# Patient Record
Sex: MALE | Race: WHITE | Employment: FULL TIME | URBAN - METROPOLITAN AREA
[De-identification: names, ages, dates, MRNs, and addresses within clinical notes are randomized per-mention and may not be internally consistent; named-entity substitution may affect disease eponyms.]

---

## 2024-08-07 NOTE — PROGRESS NOTES
[facility]  DIVISION OF OTOLARYNGOLOGY- HEAD & NECK SURGERY  CONSULT    Patient Name: Amador Bradley  Medical Record Number:  [id number]  Primary Care Physician:  No primary care provider on file.  Date of Consultation: 8/8/2024    Chief Complaint:   Chief Complaint   Patient presents with    New Patient     Patient is being seen for an ear cleaning.       HISTORY OF PRESENT ILLNESS  Amador is a(n) 59 y.o. male who presents for evaluation of hearing loss.  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  He does have a wax impaction issues as well and typically gets his ears cleaned somewhat frequently.  He states that he is unable to hear at all out of the right today and the left is starting to get that way as well.  There is no problem list on file for this patient.    No past surgical history on file.  No family history on file.  Social History     Socioeconomic History    Marital status: Single     Spouse name: Not on file    Number of children: Not on file    Years of education: Not on file    Highest education level: Not on file   Occupational History    Not on file   Tobacco Use    Smoking status: Never    Smokeless tobacco: Never   Substance and Sexual Activity    Alcohol use: Not on file    Drug use: Not on file    Sexual activity: Not on file   Other Topics Concern    Not on file   Social History Narrative    Not on file     Social Determinants of Health     Financial Resource Strain: Not on file   Food Insecurity: Not on file   Transportation Needs: Not on file   Physical Activity: Not on file   Stress: Not on file   Social Connections: Not on file   Intimate Partner Violence: Not on file   Housing Stability: Not on file     DRUG/FOOD ALLERGIES: Patient has no known allergies.  CURRENT MEDICATIONS  Prior to Admission medications    Not on File     REVIEW OF SYSTEMS  The following systems were reviewed and revealed the following in

## 2024-08-08 ENCOUNTER — OFFICE VISIT (OUTPATIENT)
Dept: ENT CLINIC | Age: 59
End: 2024-08-08

## 2024-08-08 VITALS
DIASTOLIC BLOOD PRESSURE: 88 MMHG | BODY MASS INDEX: 25.03 KG/M2 | WEIGHT: 195 LBS | HEART RATE: 70 BPM | RESPIRATION RATE: 16 BRPM | HEIGHT: 74 IN | SYSTOLIC BLOOD PRESSURE: 134 MMHG

## 2024-08-08 DIAGNOSIS — H91.93 BILATERAL HEARING LOSS, UNSPECIFIED HEARING LOSS TYPE: ICD-10-CM

## 2024-08-08 DIAGNOSIS — H61.23 BILATERAL IMPACTED CERUMEN: Primary | ICD-10-CM

## 2024-08-08 PROCEDURE — 69210 REMOVE IMPACTED EAR WAX UNI: CPT | Performed by: STUDENT IN AN ORGANIZED HEALTH CARE EDUCATION/TRAINING PROGRAM

## 2024-08-08 PROCEDURE — 99203 OFFICE O/P NEW LOW 30 MIN: CPT | Performed by: STUDENT IN AN ORGANIZED HEALTH CARE EDUCATION/TRAINING PROGRAM

## 2024-08-08 ASSESSMENT — ENCOUNTER SYMPTOMS
EYE PAIN: 0
RHINORRHEA: 0
COUGH: 0
SHORTNESS OF BREATH: 0
VOMITING: 0
NAUSEA: 0

## 2025-03-11 ENCOUNTER — INITIAL CONSULT (OUTPATIENT)
Dept: SURGERY | Age: 60
End: 2025-03-11

## 2025-03-11 VITALS — SYSTOLIC BLOOD PRESSURE: 136 MMHG | DIASTOLIC BLOOD PRESSURE: 72 MMHG

## 2025-03-11 DIAGNOSIS — K40.90 RIGHT INGUINAL HERNIA: Primary | ICD-10-CM

## 2025-03-11 NOTE — PROGRESS NOTES
Department of General Surgery Consult    PATIENT NAME: Amador Bradley   YOB: 1965     TODAY'S DATE: 3/11/2025    Reason for Consult:  Right inguinal Hernia    Chief Complaint: Right inguinal Hernia    Historian: Patient    Requesting Practitioner:  Virginia Stearns    HISTORY OF PRESENT ILLNESS:              The patient is a 59 y.o. male who presents with Right inguinal hernia. Was lifting something heavy about 7 years ago, didn't feel anything but was in the shower later and saw a bulge. Saw his urologist and confirmed it was a hernia, has been doing some watchful waiting but feels it has started to get larger in past few months and would like it to be fixed. Felt like at one point it had healed up but then picked up something heavy and reproduced the hernia. Has not had a hernia before. 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      Denies any Abdominal surgeries.     Only takes Vitamin D Supplement.     Denies: N/V/D/C     Past Medical History:    No past medical history on file.    Past Surgical History:    No past surgical history on file.    Current Medications:   No current facility-administered medications for this visit.  Prior to Admission medications    Not on File        Allergies:  Patient has no known allergies.    Social History:   TOBACCO:  Never used tobacco  ETOH:  2-3 Beer's a night,   DRUGS:  Never used recreational drugs  OCCUPATION:  /  Patient currently lives alone    Family History:    No family history on file.    REVIEW OF SYSTEMS:  CONSTITUTIONAL:  negative  HEENT:  negative  RESPIRATORY:  negative  CARDIOVASCULAR:  negative  GASTROINTESTINAL:  negative except for abdominal mass  GENITOURINARY:  negative  HEMATOLOGIC/LYMPHATIC:  negative  NEUROLOGICAL:  Negative  * All other ROS reviewed and negative.       PHYSICAL EXAM:  VITALS:  /72 (BP Site: Left Upper Arm, Patient Position: Sitting, BP Cuff Size: Medium Adult)

## 2025-03-12 ENCOUNTER — PREP FOR PROCEDURE (OUTPATIENT)
Dept: SURGERY | Age: 60
End: 2025-03-12

## 2025-03-12 ENCOUNTER — TELEPHONE (OUTPATIENT)
Dept: SURGERY | Age: 60
End: 2025-03-12

## 2025-03-12 DIAGNOSIS — K40.90 RIGHT INGUINAL HERNIA: ICD-10-CM

## 2025-03-12 RX ORDER — SODIUM CHLORIDE 0.9 % (FLUSH) 0.9 %
5-40 SYRINGE (ML) INJECTION EVERY 12 HOURS SCHEDULED
OUTPATIENT
Start: 2025-03-12

## 2025-03-12 RX ORDER — SODIUM CHLORIDE 0.9 % (FLUSH) 0.9 %
5-40 SYRINGE (ML) INJECTION PRN
OUTPATIENT
Start: 2025-03-12

## 2025-03-12 RX ORDER — SODIUM CHLORIDE 9 MG/ML
INJECTION, SOLUTION INTRAVENOUS PRN
OUTPATIENT
Start: 2025-03-12

## 2025-03-12 NOTE — PROGRESS NOTES
Amador Stone    Age 59 y.o.    male    1965    MRN [id number]    4/3/2025  Arrival Time_____________  OR Time____________130 Min     Procedure(s):  ROBOTIC RIGHT INGUINAL HERNIA REPAIR, POSSIBLE OPEN PROCEDURE                      General   Surgeon(s):  Rasta Solano, MD      DAY ADMIT ___  SDS/OP ___  OUTPT IN BED ___        Phone [phone] (home) [phone] (work)                 PCP _____________________ Phone_________________ Epic ( ) Epic CE ( ) Appt ________    NOTES: _________________________________________ Consult/Cardio _______________    ____________________________________________________________________________    ____________________________________________________________________________  PAT APPT DATE:________ TIME: ________  FAXED QAD: _______  (__) H&P w/ Hospitalist    (__) PAT orders in EPIC    (__) Meet with PAT nurse  __________________________________________________________________________  Preop Nurse phone screen complete: _____________  (__) CBC     (__) W/ DIFF ___________  (__) CT CHEST  __________   (__) Hgb A1C    ___________  (__) CHEST X RAY   __________  (__) LIPID PROFILE  ___________  (__) EKG   __________  (__) PT-INR / APTT  ___________  (__) PFT's   __________  (__) BMP   ___________  (__) CAROTIDS  __________  (__) CMP   ___________  (__) VEIN MAPPING  __________  (__) U/A   ___________  ( X ) HISTORY & PHYSICAL __________  (__) URINE C & S  ___________  (__) CARDIAC CLEARANCE __________  (__) U/A W/ FLEX  ___________  (__) PULM. CLEARANCE __________  (__) SERUM PREGNANCY ___________  (__) Preop Orders in EPIC __________  (__) TYPE & SCREEN __________repeat ( ) (__)  __________________ __________  (__) Albumin   ___________  (__)  __________________ __________  (__) TRANSFERRIN  ___________  (__)  __________________ __________  (__) LIVER PROFILE  ___________  (__) URINE PREG DOS __________  (__) MRSA NASAL SWAB ___________  (__) BLOOD SUGAR DOS __________  (__)

## 2025-03-12 NOTE — TELEPHONE ENCOUNTER
Pt saw Dr Solano in the office 3/11/25 and was given surgery instructions for a Robotic Right Inguinal Hernia Repair, Possible Open Procedure (General Anes) scheduled 4/3/25 @ 9:15am arrival 7:15am [facility] - NPO after midnight ja b/f surgery - Pt will need  day of surgery - Dr Stearns to complete pre-op H&P - Pt understood and agreed w/ above noted  
English

## 2025-03-13 NOTE — ADDENDUM NOTE
Addended by: CELINE MONROY on: 3/12/2025 08:25 PM     Modules accepted: Orders, Level of Service

## 2025-03-13 NOTE — PATIENT INSTRUCTIONS
[facility]  Phone: [phone]  Fax: [phone]    You will be scheduled for surgery with Dr. Solano.   The office will call you with the date and time that surgery is scheduled.  Please take note of these instructions for surgery:  You should have nothing by mouth after midnight the night before your surgery - this includes no food or water.   Your surgery will be cancelled if you have taken anything by mouth after midnight, NO exceptions.   You will need to have a history and physical prior to your surgery. This will need to be completed up to 30 days before your surgery. This H/P can be completed by your family doctor or the hospital.   IF you take coumadin (warfarin), please stop taking this medication 5 days prior to your surgery.   IF you take plavix, please stop taking this 7 days prior to your surgery.   Please contact our office if you have a pacemaker or defibrillator.  IF you are allergic to latex, please tell our office prior to your surgery. This is important in know before scheduling your surgery.  IF you are having an out patient surgery, you will need someone available to drive you home after your surgery, and to also stay with you for the rest of the day.   IF you are having a surgery requiring an inpatient stay in the hospital, you will need someone to drive you home upon discharge from the hospital.  Please contact Dr. Solano's assistant Bety if you have any questions or concerns.  Please call the office with any changes in your symptoms or further questions/concerns. [phone]

## 2025-03-25 RX ORDER — SILDENAFIL 100 MG/1
100 TABLET, FILM COATED ORAL DAILY
COMMUNITY
End: 2025-03-25

## 2025-03-25 NOTE — PROGRESS NOTES
Surgery Date and Time:  4/3/25 @ 09:15 am      Arrival Time:  07:15 am       The instructions given when a patient needs to stop oral intake prior to surgery varies. Follow the instructions you were     given by your Surgeon or RN during the Pre-op call.      __X__Do not eat or drink anything after Midnight the night before the surgery. NO gum, mints, candy or ice chips the day of surgery.       Only take the following medications with a small sip of water the morning of surgery:  none        Aspirin, Ibuprofen, Advil, Naproxen, Vitamin E and other Anti-inflammatory products and supplements should be stopped       for 5-7days before surgery or as directed by your physician/surgeon.     - Do not smoke or vape, and do not drink any alcoholic beverages 24 hours prior to surgery, this includes NA Beer. Refrain from using     any recreational drugs, including non-prescribed prescription drugs.     -You may brush your teeth and gargle the morning of surgery.  DO NOT SWALLOW WATER.    -You MUST plan for a responsible adult to stay on site while you are here and take you home after your surgery. You will not be allowed                to leave alone or drive yourself home. It is requested someone stay with you the first 24 hrs. Your surgery will be cancelled if you do not                have a ride home with a responsible adult.    -A parent/legal guardian must accompany a child scheduled for surgery and plan to stay at the hospital until the child                is discharged. Please do not bring other children with you.    -Please wear simple, loose-fitting clothing to the hospital. Do not bring valuables (money, credit cards, checkbooks, etc.)                Do not wear any makeup (including no eye makeup) and no nail polish if applicable.             - DO NOT wear any jewelry or body piercings day of surgery.  All body piercing jewelry must be removed.             - If you have dentures they will be removed before

## 2025-04-02 ENCOUNTER — ANESTHESIA EVENT (OUTPATIENT)
Dept: OPERATING ROOM | Age: 60
End: 2025-04-02
Payer: COMMERCIAL

## 2025-04-03 ENCOUNTER — HOSPITAL ENCOUNTER (OUTPATIENT)
Age: 60
Setting detail: OUTPATIENT SURGERY
Discharge: HOME | End: 2025-04-03
Attending: SURGERY | Admitting: SURGERY
Payer: COMMERCIAL

## 2025-04-03 ENCOUNTER — ANESTHESIA (OUTPATIENT)
Dept: OPERATING ROOM | Age: 60
End: 2025-04-03
Payer: COMMERCIAL

## 2025-04-03 VITALS
HEIGHT: 74 IN | DIASTOLIC BLOOD PRESSURE: 87 MMHG | BODY MASS INDEX: 25.41 KG/M2 | TEMPERATURE: 97.1 F | OXYGEN SATURATION: 95 % | WEIGHT: 198 LBS | HEART RATE: 67 BPM | SYSTOLIC BLOOD PRESSURE: 135 MMHG | RESPIRATION RATE: 26 BRPM

## 2025-04-03 DIAGNOSIS — G89.18 POSTOPERATIVE PAIN: Primary | ICD-10-CM

## 2025-04-03 PROCEDURE — 6360000002 HC RX W HCPCS: Performed by: ANESTHESIOLOGY

## 2025-04-03 PROCEDURE — 3700000001 HC ADD 15 MINUTES (ANESTHESIA): Performed by: SURGERY

## 2025-04-03 PROCEDURE — 2500000003 HC RX 250 WO HCPCS: Performed by: SURGERY

## 2025-04-03 PROCEDURE — 2580000003 HC RX 258

## 2025-04-03 PROCEDURE — 3600000009 HC SURGERY ROBOT BASE: Performed by: SURGERY

## 2025-04-03 PROCEDURE — C1781 MESH (IMPLANTABLE): HCPCS | Performed by: SURGERY

## 2025-04-03 PROCEDURE — 6360000002 HC RX W HCPCS: Performed by: SURGERY

## 2025-04-03 PROCEDURE — 7100000010 HC PHASE II RECOVERY - FIRST 15 MIN: Performed by: SURGERY

## 2025-04-03 PROCEDURE — 6360000002 HC RX W HCPCS

## 2025-04-03 PROCEDURE — 2709999900 HC NON-CHARGEABLE SUPPLY: Performed by: SURGERY

## 2025-04-03 PROCEDURE — 49650 LAP ING HERNIA REPAIR INIT: CPT | Performed by: SURGERY

## 2025-04-03 PROCEDURE — 3600000019 HC SURGERY ROBOT ADDTL 15MIN: Performed by: SURGERY

## 2025-04-03 PROCEDURE — S2900 ROBOTIC SURGICAL SYSTEM: HCPCS | Performed by: SURGERY

## 2025-04-03 PROCEDURE — 7100000000 HC PACU RECOVERY - FIRST 15 MIN: Performed by: SURGERY

## 2025-04-03 PROCEDURE — 3700000000 HC ANESTHESIA ATTENDED CARE: Performed by: SURGERY

## 2025-04-03 PROCEDURE — 2500000003 HC RX 250 WO HCPCS

## 2025-04-03 PROCEDURE — 7100000011 HC PHASE II RECOVERY - ADDTL 15 MIN: Performed by: SURGERY

## 2025-04-03 PROCEDURE — 7100000001 HC PACU RECOVERY - ADDTL 15 MIN: Performed by: SURGERY

## 2025-04-03 DEVICE — 3DMAX MID ANATOMICAL MESH, 12 CM X 17 CM (5" X 7"), EXTRA LARGE, RIGHT
Type: IMPLANTABLE DEVICE | Status: FUNCTIONAL
Brand: 3DMAX

## 2025-04-03 RX ORDER — SODIUM CHLORIDE 9 MG/ML
INJECTION, SOLUTION INTRAVENOUS PRN
Status: DISCONTINUED | OUTPATIENT
Start: 2025-04-03 | End: 2025-04-03 | Stop reason: HOSPADM

## 2025-04-03 RX ORDER — MIDAZOLAM HYDROCHLORIDE 1 MG/ML
2 INJECTION, SOLUTION INTRAMUSCULAR; INTRAVENOUS
Status: DISCONTINUED | OUTPATIENT
Start: 2025-04-03 | End: 2025-04-03 | Stop reason: HOSPADM

## 2025-04-03 RX ORDER — SODIUM CHLORIDE 0.9 % (FLUSH) 0.9 %
5-40 SYRINGE (ML) INJECTION PRN
Status: DISCONTINUED | OUTPATIENT
Start: 2025-04-03 | End: 2025-04-03 | Stop reason: HOSPADM

## 2025-04-03 RX ORDER — MIDAZOLAM HYDROCHLORIDE 1 MG/ML
INJECTION, SOLUTION INTRAMUSCULAR; INTRAVENOUS
Status: DISCONTINUED | OUTPATIENT
Start: 2025-04-03 | End: 2025-04-03 | Stop reason: SDUPTHER

## 2025-04-03 RX ORDER — FENTANYL CITRATE 50 UG/ML
INJECTION, SOLUTION INTRAMUSCULAR; INTRAVENOUS
Status: DISCONTINUED | OUTPATIENT
Start: 2025-04-03 | End: 2025-04-03 | Stop reason: SDUPTHER

## 2025-04-03 RX ORDER — ONDANSETRON 2 MG/ML
INJECTION INTRAMUSCULAR; INTRAVENOUS
Status: DISCONTINUED | OUTPATIENT
Start: 2025-04-03 | End: 2025-04-03 | Stop reason: SDUPTHER

## 2025-04-03 RX ORDER — SODIUM CHLORIDE, SODIUM LACTATE, POTASSIUM CHLORIDE, CALCIUM CHLORIDE 600; 310; 30; 20 MG/100ML; MG/100ML; MG/100ML; MG/100ML
INJECTION, SOLUTION INTRAVENOUS CONTINUOUS
Status: DISCONTINUED | OUTPATIENT
Start: 2025-04-03 | End: 2025-04-03 | Stop reason: HOSPADM

## 2025-04-03 RX ORDER — OXYCODONE HYDROCHLORIDE 5 MG/1
5 TABLET ORAL PRN
Status: DISCONTINUED | OUTPATIENT
Start: 2025-04-03 | End: 2025-04-03 | Stop reason: HOSPADM

## 2025-04-03 RX ORDER — SODIUM CHLORIDE 0.9 % (FLUSH) 0.9 %
5-40 SYRINGE (ML) INJECTION EVERY 12 HOURS SCHEDULED
Status: DISCONTINUED | OUTPATIENT
Start: 2025-04-03 | End: 2025-04-03 | Stop reason: HOSPADM

## 2025-04-03 RX ORDER — SODIUM CHLORIDE 9 MG/ML
INJECTION, SOLUTION INTRAVENOUS
Status: DISCONTINUED | OUTPATIENT
Start: 2025-04-03 | End: 2025-04-03 | Stop reason: SDUPTHER

## 2025-04-03 RX ORDER — LIDOCAINE HYDROCHLORIDE 10 MG/ML
2 INJECTION, SOLUTION INFILTRATION; PERINEURAL
Status: DISCONTINUED | OUTPATIENT
Start: 2025-04-03 | End: 2025-04-03 | Stop reason: HOSPADM

## 2025-04-03 RX ORDER — PROPOFOL 10 MG/ML
INJECTION, EMULSION INTRAVENOUS
Status: DISCONTINUED | OUTPATIENT
Start: 2025-04-03 | End: 2025-04-03 | Stop reason: SDUPTHER

## 2025-04-03 RX ORDER — BUPIVACAINE HYDROCHLORIDE 5 MG/ML
INJECTION, SOLUTION EPIDURAL; INTRACAUDAL; PERINEURAL PRN
Status: DISCONTINUED | OUTPATIENT
Start: 2025-04-03 | End: 2025-04-03 | Stop reason: ALTCHOICE

## 2025-04-03 RX ORDER — MEPERIDINE HYDROCHLORIDE 50 MG/ML
12.5 INJECTION INTRAMUSCULAR; INTRAVENOUS; SUBCUTANEOUS EVERY 5 MIN PRN
Status: DISCONTINUED | OUTPATIENT
Start: 2025-04-03 | End: 2025-04-03 | Stop reason: HOSPADM

## 2025-04-03 RX ORDER — ROCURONIUM BROMIDE 10 MG/ML
INJECTION, SOLUTION INTRAVENOUS
Status: DISCONTINUED | OUTPATIENT
Start: 2025-04-03 | End: 2025-04-03 | Stop reason: SDUPTHER

## 2025-04-03 RX ORDER — LIDOCAINE HYDROCHLORIDE 20 MG/ML
INJECTION, SOLUTION INFILTRATION; PERINEURAL
Status: DISCONTINUED | OUTPATIENT
Start: 2025-04-03 | End: 2025-04-03 | Stop reason: SDUPTHER

## 2025-04-03 RX ORDER — DIPHENHYDRAMINE HYDROCHLORIDE 50 MG/ML
6.25 INJECTION, SOLUTION INTRAMUSCULAR; INTRAVENOUS
Status: DISCONTINUED | OUTPATIENT
Start: 2025-04-03 | End: 2025-04-03 | Stop reason: HOSPADM

## 2025-04-03 RX ORDER — DEXAMETHASONE SODIUM PHOSPHATE 4 MG/ML
INJECTION, SOLUTION INTRA-ARTICULAR; INTRALESIONAL; INTRAMUSCULAR; INTRAVENOUS; SOFT TISSUE
Status: DISCONTINUED | OUTPATIENT
Start: 2025-04-03 | End: 2025-04-03 | Stop reason: SDUPTHER

## 2025-04-03 RX ORDER — OXYCODONE HYDROCHLORIDE 5 MG/1
10 TABLET ORAL PRN
Status: DISCONTINUED | OUTPATIENT
Start: 2025-04-03 | End: 2025-04-03 | Stop reason: HOSPADM

## 2025-04-03 RX ORDER — OXYCODONE AND ACETAMINOPHEN 5; 325 MG/1; MG/1
1 TABLET ORAL EVERY 4 HOURS PRN
Qty: 12 TABLET | Refills: 0 | Status: SHIPPED | OUTPATIENT
Start: 2025-04-03 | End: 2025-04-06

## 2025-04-03 RX ORDER — ONDANSETRON 2 MG/ML
4 INJECTION INTRAMUSCULAR; INTRAVENOUS ONCE
Status: COMPLETED | OUTPATIENT
Start: 2025-04-03 | End: 2025-04-03

## 2025-04-03 RX ORDER — NALOXONE HYDROCHLORIDE 0.4 MG/ML
INJECTION, SOLUTION INTRAMUSCULAR; INTRAVENOUS; SUBCUTANEOUS PRN
Status: DISCONTINUED | OUTPATIENT
Start: 2025-04-03 | End: 2025-04-03 | Stop reason: HOSPADM

## 2025-04-03 RX ADMIN — ONDANSETRON 4 MG: 2 INJECTION INTRAMUSCULAR; INTRAVENOUS at 12:46

## 2025-04-03 RX ADMIN — FENTANYL CITRATE 100 MCG: 50 INJECTION, SOLUTION INTRAMUSCULAR; INTRAVENOUS at 11:33

## 2025-04-03 RX ADMIN — ROCURONIUM BROMIDE 50 MG: 50 INJECTION, SOLUTION INTRAVENOUS at 11:36

## 2025-04-03 RX ADMIN — DEXAMETHASONE SODIUM PHOSPHATE 4 MG: 4 INJECTION, SOLUTION INTRAMUSCULAR; INTRAVENOUS at 11:52

## 2025-04-03 RX ADMIN — ONDANSETRON 4 MG: 2 INJECTION, SOLUTION INTRAMUSCULAR; INTRAVENOUS at 13:13

## 2025-04-03 RX ADMIN — SODIUM CHLORIDE: 9 INJECTION, SOLUTION INTRAVENOUS at 11:29

## 2025-04-03 RX ADMIN — MIDAZOLAM 2 MG: 1 INJECTION INTRAMUSCULAR; INTRAVENOUS at 11:27

## 2025-04-03 RX ADMIN — HYDROMORPHONE HYDROCHLORIDE 0.5 MG: 1 INJECTION, SOLUTION INTRAMUSCULAR; INTRAVENOUS; SUBCUTANEOUS at 12:51

## 2025-04-03 RX ADMIN — SUGAMMADEX 200 MG: 100 INJECTION, SOLUTION INTRAVENOUS at 12:54

## 2025-04-03 RX ADMIN — CEFAZOLIN 2000 MG: 2 INJECTION, POWDER, FOR SOLUTION INTRAVENOUS at 11:49

## 2025-04-03 RX ADMIN — PROPOFOL 200 MG: 10 INJECTION, EMULSION INTRAVENOUS at 11:35

## 2025-04-03 RX ADMIN — LIDOCAINE HYDROCHLORIDE 100 MG: 20 INJECTION, SOLUTION INFILTRATION; PERINEURAL at 11:33

## 2025-04-03 RX ADMIN — HYDROMORPHONE HYDROCHLORIDE 0.5 MG: 1 INJECTION, SOLUTION INTRAMUSCULAR; INTRAVENOUS; SUBCUTANEOUS at 12:47

## 2025-04-03 ASSESSMENT — PAIN - FUNCTIONAL ASSESSMENT
PAIN_FUNCTIONAL_ASSESSMENT: NONE - DENIES PAIN
PAIN_FUNCTIONAL_ASSESSMENT: 0-10

## 2025-04-03 NOTE — ANESTHESIA PRE PROCEDURE
Department of Anesthesiology  Preprocedure Note       Name:  Amador Bradley   Age:  59 y.o.  :  1965                                          MRN:  [id number]         Date:  4/3/2025      Surgeon: Surgeon(s):  Rasta Solano MD    Procedure: Procedure(s):  ROBOTIC RIGHT INGUINAL HERNIA REPAIR, POSSIBLE OPEN PROCEDURE    Medications prior to admission:   Prior to Admission medications    Not on File       Current medications:    Current Facility-Administered Medications   Medication Dose Route Frequency Provider Last Rate Last Admin   • lidocaine 1 % injection 2 mL  2 mL IntraDERmal Once PRN Con Banegas MD       • lactated ringers infusion   IntraVENous Continuous Con Banegas MD       • sodium chloride flush 0.9 % injection 5-40 mL  5-40 mL IntraVENous 2 times per day Rasta Solano MD       • sodium chloride flush 0.9 % injection 5-40 mL  5-40 mL IntraVENous PRN Rasta Solano MD       • 0.9 % sodium chloride infusion   IntraVENous PRN Rasta Solano MD       • ceFAZolin (ANCEF) 2,000 mg in sterile water 20 mL IV syringe  2,000 mg IntraVENous On Call to OR Solano, Rasta Conklin MD           Allergies:  No Known Allergies    Problem List:    Patient Active Problem List   Diagnosis Code   • Right inguinal hernia K40.90       Past Medical History:        Diagnosis Date   • Colon polyp    • Former smoker    • Inguinal hernia, right        Past Surgical History:        Procedure Laterality Date   • COLONOSCOPY      states no anesthesia   • DENTAL SURGERY      , 2017   • TONSILLECTOMY         Social History:    Social History     Tobacco Use   • Smoking status: Former     Current packs/day: 0.00     Types: Cigarettes     Quit date: 1993     Years since quittin.2   • Smokeless tobacco: Never   Substance Use Topics   • Alcohol use: Yes     Alcohol/week: 14.0 standard drinks of alcohol     Types: 14 Cans of beer per week     Comment: socially

## 2025-04-03 NOTE — OP NOTE
Operative Note      Patient: Amador Bradley  YOB: 1965  MRN: [id number]    Date of Procedure: 4/3/2025    Pre-Op Diagnosis Codes:      * Right inguinal hernia [K40.90]    Post-Op Diagnosis: Same       Procedure(s):  ROBOTIC RIGHT INGUINAL HERNIA REPAIR    Surgeon(s):  Rasta Solano MD    Assistant:   Surgical Assistant: Valentin Ruvalcaba; Marquez, Sung; Summer Isabel    Anesthesia: General    Estimated Blood Loss (mL): less than 50     Complications: None    Specimens:   * No specimens in log *    Implants:  Implant Name Type Inv. Item Serial No.  Lot No. LRB No. Used Action   MESH TANISHA MID ROSA XL 7X5 IN RT 3DMAX - [id number]  MESH TANISHA MID ROSA XL 7X5 IN RT 3DMAX  BARD DAVOL-WD [id number] 1 Implanted         Drains: * No LDAs found *    Findings:  Infection Present At Time Of Surgery (PATOS) (choose all levels that have infection present):  No infection present  Other Findings: large indirect RIH, reduced           Preperitoneal placement of XL 3D Mid mesh    Detailed Description of Procedure:           PROCEDURE: The patient was placed on the table and placed under general anesthesia. Abdomen was prepped and draped in a sterile fashion. An 8-mm trocar incision was made in the lateral left abdomen, and the trocar passed through the abdominal wall under direct vision. The camera was introduced into the abdomen and additional 8 mm trocars placed in the supraumbilical midline and the right lateral abdomen, both under direct vision. No injury was noted from the placement of the ports. The robot was then docked.    Both right and left inguinal areas were visualized and it appeared that there was a defect only on the right. The peritoneum above the inguinal region was incised horizontally and opened to midline. The peritoneal flap was dissected down across the inguinal region until the pubic ramus and tubercle were exposed.  I dissected down below the ramus into the space of

## 2025-04-03 NOTE — ANESTHESIA POSTPROCEDURE EVALUATION
Department of Anesthesiology  Postprocedure Note    Patient: Amador Bradley  MRN: [id number]  YOB: 1965  Date of evaluation: 4/3/2025    Procedure Summary       Date: 04/03/25 Room / Location: [address]    Anesthesia Start: 1129 Anesthesia Stop: 1308    Procedure: ROBOTIC RIGHT INGUINAL HERNIA REPAIR (Right: Abdomen) Diagnosis:       Right inguinal hernia      (Right inguinal hernia [K40.90])    Surgeons: Rasta Solano MD Responsible Provider: Atul Blanco MD    Anesthesia Type: general ASA Status: 1            Anesthesia Type: No value filed.    Aamir Phase I: Aamir Score: 10    Aamir Phase II: Aamir Score: 10    Anesthesia Post Evaluation    Patient location during evaluation: PACU  Level of consciousness: awake  Airway patency: patent  Nausea & Vomiting: no vomiting  Cardiovascular status: blood pressure returned to baseline  Respiratory status: acceptable  Hydration status: stable  Pain management: adequate    No notable events documented.

## 2025-04-03 NOTE — DISCHARGE INSTRUCTIONS
[facility]    Rasta Solano M.D.   [facility] Office      [facility] Office          [facility]               [address]                [address]  Ayush Jones M.D.              [address]           [address]            [place] [address]         [place] [address]  Jonny Hightower M.D                         [phone]          [facility]                   Adan Terry M.D.            [facility]                                                        POST-OPERATIVE INSTRUCTIONS HERNIA REPAIR    Call the office to schedule your post-operative appointment with your surgeon for two (2) weeks.     If you still have bandages over your incisions, you  may remove them in 2-3 days.    Place an ice pack over your incisions on and off (15min) at a time for the next 2 days.    General guidelines for activity:      Avoid strenuous activity or lifting anything heavier than 15 pounds.       It is OK to be up and walking around. Going up and down stairs is   also OK.      Do what is comfortable: stop and rest when you feel tired.     You will have pain medicine ordered. Take as directed.     Do NOT drive while taking your narcotic pain medicine.      Watch for signs of infection:    Excessive warmth or bright redness around your incisions    Leakage of cloudy fluid from you incisions    Fever over 101.5    If you experience constipation  Increase your water intake.  Increase your activity; walking is best.  A stool softener or mild laxative may be necessary if you still have not had a bowel  movement ; call the office for further instructions.    Please take note: IF you do not take all of your narcotic pain medication, we ask that you dispose of these responsibly.   Drug drop off boxes are located in the [facility] and [facility] emergency departments.   These [facility] return

## 2025-04-03 NOTE — H&P
I have reviewed the history and physical and examined the patient.  I find no relevant changes.  I have reviewed with the patient and/or family members, during the preoperative office visit the risks, benefits, and alternatives to the procedure.    CELINE MONROY MD

## 2025-04-03 NOTE — PROGRESS NOTES
Patient admitted to preop bay 7. Consents verified. Patient NPO since 2030. Patient belongings to remain on PACU cart for procedure.

## 2025-04-18 ENCOUNTER — OFFICE VISIT (OUTPATIENT)
Dept: SURGERY | Age: 60
End: 2025-04-18

## 2025-04-18 VITALS
WEIGHT: 192.8 LBS | SYSTOLIC BLOOD PRESSURE: 118 MMHG | BODY MASS INDEX: 24.74 KG/M2 | HEIGHT: 74 IN | DIASTOLIC BLOOD PRESSURE: 80 MMHG

## 2025-04-18 DIAGNOSIS — Z09 POSTOP CHECK: Primary | ICD-10-CM

## 2025-04-18 PROCEDURE — 99024 POSTOP FOLLOW-UP VISIT: CPT | Performed by: SURGERY

## 2025-04-19 NOTE — PATIENT INSTRUCTIONS
Continue with routine wound care as discussed  Gradually increase activities as tolerated  Follow-up in 2 weeks or as needed; please call with questions or concerns

## 2025-04-19 NOTE — PROGRESS NOTES
Surgery Post-op Progress Note    HPI:  Notes reviewed, and agree with documentation in pt's chart.   Postoperative Follow-up: Patient presents for 2 week follow-up status post robotic right inguinal hernia repair . He recently had to do some mildly strenuous exercise/activity and noted increased pain and swelling in his right inguinal canal    ROS:    10 point review of systems performed; please refer to HPI with pertinent positives, all other ROS are negative    A review of the patient's record including allergies, medication list, tobacco history, family history, problem list, medical history and social history has been completed and updates made to the patient's EMR where indicated.     PE:   CONSTITUTIONAL:  awake and alert    ABDOMEN: soft, non-distended     INCISION: R groin w/ large fixed, firm swelling extending into top of scrotum, non-reducible      ASSESSMENT:   Diagnosis Orders   1. Postop check          Typical seroma/hematoma in the inguinal canal, seen after reducing a large hernia sac    PLAN:    Continue with routine wound care as discussed  Gradually increase activities as tolerated  Follow-up in 2 weeks or as needed; please call with questions or concerns

## 2025-05-02 ENCOUNTER — OFFICE VISIT (OUTPATIENT)
Dept: SURGERY | Age: 60
End: 2025-05-02

## 2025-05-02 VITALS — SYSTOLIC BLOOD PRESSURE: 124 MMHG | DIASTOLIC BLOOD PRESSURE: 70 MMHG

## 2025-05-02 DIAGNOSIS — Z09 POSTOP CHECK: Primary | ICD-10-CM

## 2025-05-02 DIAGNOSIS — L76.34 POSTOPERATIVE SEROMA OF SUBCUTANEOUS TISSUE AFTER NON-DERMATOLOGIC PROCEDURE: ICD-10-CM

## 2025-05-02 PROCEDURE — 99024 POSTOP FOLLOW-UP VISIT: CPT | Performed by: SURGERY

## 2025-05-02 NOTE — PROGRESS NOTES
Surgery Post-op Progress Note    HPI:  Notes reviewed, and agree with documentation in pt's chart.   Postoperative Follow-up: Patient presents for 4 week follow-up status post robotic right inguinal hernia repair . At last visit he was noted to have a moderate-sized likely seroma within the proximal inguinal canal.  Since last visit he overall feels well, no residual pain at incision sites..  Swelling in right inguinal canal persists, overall does not bother him except with straining/lifting/pulling    ROS:    10 point review of systems performed; please refer to HPI with pertinent positives, all other ROS are negative    A review of the patient's record including allergies, medication list, tobacco history, family history, problem list, medical history and social history has been completed and updates made to the patient's EMR where indicated.     PE:   CONSTITUTIONAL:  awake and alert    ABDOMEN: R groin - persistent moderate-sized firm swelling within inguinal canal, non-tender, fixed     INCISION: clean, dry, healed      ASSESSMENT:   Diagnosis Orders   1. Postop check        2. Postoperative seroma of subcutaneous tissue after non-dermatologic procedure        We reviewed again that an inguinal seroma is not uncommon after repair of a large indirect hernia.  Typically these will slowly resorb, but the larger they are, the longer it will take to resorb.      PLAN:    Continue with routine wound care as discussed  Gradually increase activities as tolerated  Follow-up in 2 weeks or as needed; please call with questions or concerns

## 2025-05-16 ENCOUNTER — TELEPHONE (OUTPATIENT)
Dept: SURGERY | Age: 60
End: 2025-05-16

## 2025-05-16 ENCOUNTER — OFFICE VISIT (OUTPATIENT)
Dept: SURGERY | Age: 60
End: 2025-05-16

## 2025-05-16 VITALS
DIASTOLIC BLOOD PRESSURE: 64 MMHG | BODY MASS INDEX: 25.69 KG/M2 | SYSTOLIC BLOOD PRESSURE: 122 MMHG | HEIGHT: 74 IN | WEIGHT: 200.2 LBS

## 2025-05-16 DIAGNOSIS — L76.34 POSTOPERATIVE SEROMA OF SUBCUTANEOUS TISSUE AFTER NON-DERMATOLOGIC PROCEDURE: Primary | ICD-10-CM

## 2025-05-16 PROCEDURE — 99024 POSTOP FOLLOW-UP VISIT: CPT | Performed by: SURGERY

## 2025-05-16 NOTE — TELEPHONE ENCOUNTER
Called and spoke w/ pt - Informed his CT Pelvis w/ IV only contrast is scheduled on 6/2/25 @ 8:30am arrival 8am [facility] - NPO 4 hrs prior to procedure - Bring photo ID & Ins Card - Pt understood and agreed w/ above noted

## 2025-05-16 NOTE — PROGRESS NOTES
Surgery Post-op Progress Note    HPI:  Notes reviewed, and agree with documentation in pt's chart.   Postoperative Follow-up: Patient presents for 6 week follow-up status post robotic right inguinal hernia repair.  Has noted a persistent swelling within the right inguinal canal and upper scrotum, deemed consistent with seroma/hematoma on subsequent office visits. . Eating a regular diet without difficulty.  He continues to note the swelling along the right groin but thinks it might be \"a little softer, maybe smaller\".     ROS:    10 point review of systems performed; please refer to HPI with pertinent positives, all other ROS are negative    A review of the patient's record including allergies, medication list, tobacco history, family history, problem list, medical history and social history has been completed and updates made to the patient's EMR where indicated.     PE:   CONSTITUTIONAL:  awake and alert    ABDOMEN: soft, non-distended, non-tender     INCISION: R groin - persistent firm, slightly fluctuent enlargement along the right proximal hemiscrotum, no erythema, non-tender      ASSESSMENT:   Diagnosis Orders   1. Postoperative seroma of subcutaneous tissue after non-dermatologic procedure  CT PELVIS W CONTRAST            PLAN:    Will get CT pelvis to better assess the area and confirm that this is just fluid and not any other pathology (ie recurrent hernia)  Follow up after above

## 2025-06-02 ENCOUNTER — HOSPITAL ENCOUNTER (OUTPATIENT)
Dept: CT IMAGING | Age: 60
Discharge: HOME | End: 2025-06-02
Attending: SURGERY
Payer: COMMERCIAL

## 2025-06-02 DIAGNOSIS — L76.34 POSTOPERATIVE SEROMA OF SUBCUTANEOUS TISSUE AFTER NON-DERMATOLOGIC PROCEDURE: ICD-10-CM

## 2025-06-02 LAB
PERFORMED ON: NORMAL
POC CREATININE: 1 MG/DL (ref 0.8–1.3)
POC SAMPLE TYPE: NORMAL

## 2025-06-02 PROCEDURE — 72193 CT PELVIS W/DYE: CPT

## 2025-06-02 PROCEDURE — 82565 ASSAY OF CREATININE: CPT

## 2025-06-02 PROCEDURE — 6360000004 HC RX CONTRAST MEDICATION: Performed by: SURGERY

## 2025-06-02 RX ORDER — IOPAMIDOL 755 MG/ML
75 INJECTION, SOLUTION INTRAVASCULAR
Status: COMPLETED | OUTPATIENT
Start: 2025-06-02 | End: 2025-06-02

## 2025-06-02 RX ADMIN — IOPAMIDOL 75 ML: 755 INJECTION, SOLUTION INTRAVENOUS at 08:21

## 2025-06-23 ENCOUNTER — TELEPHONE (OUTPATIENT)
Dept: SURGERY | Age: 60
End: 2025-06-23

## 2025-06-23 ENCOUNTER — TRANSCRIBE ORDERS (OUTPATIENT)
Dept: ADMINISTRATIVE | Age: 60
End: 2025-06-23

## 2025-06-23 DIAGNOSIS — R22.9 SUBCUTANEOUS NODULES: ICD-10-CM

## 2025-06-23 NOTE — TELEPHONE ENCOUNTER
Called patient to schedule appt.  Left message on voicemail to call back to schedule review of CT scan results.    I will try calling back again later today.

## 2025-06-23 NOTE — TELEPHONE ENCOUNTER
Patient states he had a CT scan done on June 2nd.   He has not heard anything about the results and would like to review them.      Amador  [phone]

## 2025-06-24 ENCOUNTER — TELEMEDICINE (OUTPATIENT)
Dept: SURGERY | Age: 60
End: 2025-06-24

## 2025-06-24 DIAGNOSIS — L76.34 POSTOPERATIVE SEROMA OF SUBCUTANEOUS TISSUE AFTER NON-DERMATOLOGIC PROCEDURE: Primary | ICD-10-CM

## 2025-06-24 PROCEDURE — 99024 POSTOP FOLLOW-UP VISIT: CPT | Performed by: SURGERY

## 2025-06-25 NOTE — ADDENDUM NOTE
SONI MAR    Patient Age: 57 year old   Interpreting service used: No    Insurance on file confirmed with caller: Yes    Patient seen within 1 year by a provider in primary care? Yes-      Refill to be: ePrescribed    Medication requested to be refilled: See Pended Medication    Addition Information: Patient states these medications do not have refills and he is running very low.     Does patient have enough to medication for 72 business hours? No- Route message to provider clinical pool- HIGH PRIORITY    Caller informed to check with the pharmacy later for their refill.  If problems arise, we will contact patient.    Message read back to caller for accuracy: Yes     WEIGHT AND HEIGHT:   Wt Readings from Last 1 Encounters:   07/15/22 71.7 kg (158 lb 2.9 oz)     Ht Readings from Last 1 Encounters:   07/15/22 5' 8.11\" (1.73 m)     BMI Readings from Last 1 Encounters:   07/15/22 23.97 kg/m²       ALLERGIES:  Patient has no known allergies.  Current Outpatient Medications   Medication Sig Dispense Refill   • ZINC SULFATE PO      • VITAMIN D, ERGOCALCIFEROL, PO      • Ascorbic Acid (vitamin C) 1000 MG tablet Take 1,000 mg by mouth daily.     • acetaminophen-codeine (TYLENOL NO.3) 300-30 MG per tablet Take 1 tablet by mouth every 4 hours as needed for Pain. 20 tablet 0   • propRANolol (INDERAL) 20 MG tablet Take 1 tablet by mouth daily as needed (anxiety, high blood pressure). 10 tablet 0   • losartan (COZAAR) 25 MG tablet Take 1 tablet by mouth daily. 30 tablet 3   • cyclobenzaprine (FLEXERIL) 10 MG tablet Take 1 tablet by mouth at bedtime. 30 tablet 0   • meloxicam (MOBIC) 15 MG tablet Take 1 tablet by mouth daily. 90 tablet 0   • DULoxetine (CYMBALTA) 30 MG capsule Take 1 capsule by mouth daily. 90 capsule 1   • rosuvastatin (Crestor) 10 MG tablet Take 1 tablet by mouth daily. 90 tablet 3   • PROTEIN PO Indications: shake after working out     • clobetasol (TEMOVATE) 0.05 % cream Apply to affected area twice per  Addended by: CELINE MONROY on: 6/25/2025 12:00 PM     Modules accepted: Level of Service     day for up to 2 weeks at a time. 30 g 3   • Multiple Vitamin tablet Take 1 tablet by mouth daily.        No current facility-administered medications for this visit.         CALL BACK INFO: Ok to leave response (including medical information) on answering machine        PCP: Cristobal Weeks MD         INS: Payor: [employer]UE [employer] / Plan: BLUE CHOICE OPT [id number] / Product Type: PPO MISC   PATIENT ADDRESS:  [address] [place] [id number]

## 2025-06-25 NOTE — ASSESSMENT & PLAN NOTE
- we will plan to have him come in to office for a scheduled aspiration of the seroma and see if that provides definitive resolution of the process.  But it could require additional aspirations if seroma recurs.    Patient indicates he understands, asks appropriate questions, and agrees with the plan.

## 2025-06-25 NOTE — PROGRESS NOTES
Amador Bradley (:  1965) is a 60 y.o. male,Established patient, here for evaluation of the following chief complaint(s):  Other (Discuss CT Results - Send V-Visit Link to: [phone])         Assessment & Plan  Postoperative seroma of subcutaneous tissue after non-dermatologic procedure   - we will plan to have him come in to office for a scheduled aspiration of the seroma and see if that provides definitive resolution of the process.  But it could require additional aspirations if seroma recurs.    Patient indicates he understands, asks appropriate questions, and agrees with the plan.           No follow-ups on file.       Subjective   HPI  Patient calls to review results of recent pelvic CT, done to evaluate the persistent swelling in the right inguinal canal region after recent inguinal hernia repair.  He describes persistent fixed, firm swelling in the inguinal canal, essentially painless.  Maybe slightly decreased in size vs his last visit with me.  Denies dysuria, hematuria.    Review of Systems       Objective   CT pelvis - images reviewed by me    FINDINGS:  In the right inguinal region and right inguinal canal there is a complex d fluid  collection with mild rim enhancement. The fluid collection measures 6.1 x 5.4 x  8.3 cm in total size with attenuation of simple fluid. Finding is presumably  related to a postoperative seroma or hematoma.     This ends adjacent to the scrotum with evidence of a right-sided hydrocele.     Urinary bladder is unremarkable. No pelvic lymphadenopathy. No fluid collection  within the pelvis. No lymphadenopathy identified.     IMPRESSION:  1. Probable seroma along the right inguinal canal without evidence of recurrent  hernia.  2. Associated adjacent right scrotal hydrocele.            An electronic signature was used to authenticate this note.    --CELINE MONROY MD

## 2025-07-15 ENCOUNTER — OFFICE VISIT (OUTPATIENT)
Dept: SURGERY | Age: 60
End: 2025-07-15

## 2025-07-15 VITALS
DIASTOLIC BLOOD PRESSURE: 82 MMHG | BODY MASS INDEX: 25.33 KG/M2 | HEIGHT: 74 IN | SYSTOLIC BLOOD PRESSURE: 138 MMHG | WEIGHT: 197.4 LBS

## 2025-07-15 DIAGNOSIS — L72.3 SEBACEOUS CYST: ICD-10-CM

## 2025-07-15 DIAGNOSIS — L76.34 POSTOPERATIVE SEROMA OF SUBCUTANEOUS TISSUE AFTER NON-DERMATOLOGIC PROCEDURE: Primary | ICD-10-CM

## 2025-07-15 PROCEDURE — 99024 POSTOP FOLLOW-UP VISIT: CPT | Performed by: SURGERY

## 2025-07-15 NOTE — PROGRESS NOTES
Postoperative Follow-up: Patient presents for 14 week follow-up status post robotic right inguinal hernia repair.  Has noted a persistent swelling within the right inguinal canal and upper scrotum, deemed consistent with seroma/hematoma on subsequent office visits.  Has been monitoring for eventual resorption of the seroma, but at this point he notes persistent firm, fixed swelling, no significant reduction in size.  Denies difficulties with voiding. Minimal discomfort/pain.    Exam - persistent firm indurated swelling along the distal right inguinal canal/upper scrotum and separate swelling around the testicle in the scrotum.    Impression:  persistent postoperative seroma of the right inguinal canal    Plan:  at this point, will pursue direct intervention to clear/resolve the process    - will arrange for CT- vs U/S-guided aspiration of the seroma with possible percutaneous drain placement   - follow up after drainage procedure completed    Patient also mentions a separate problem - has a central/sternal sebaceous cyst, minimally symptomatic.  Once the seroma issue has been resolved, we can discuss elective excision of this cyst under local anesthesia.    BRIA

## 2025-07-17 ENCOUNTER — HOSPITAL ENCOUNTER (OUTPATIENT)
Dept: CT IMAGING | Age: 60
Discharge: HOME | End: 2025-07-17
Attending: SURGERY
Payer: COMMERCIAL

## 2025-07-17 DIAGNOSIS — L76.34 POSTOPERATIVE SEROMA OF SUBCUTANEOUS TISSUE AFTER NON-DERMATOLOGIC PROCEDURE: ICD-10-CM

## 2025-07-17 PROCEDURE — 87205 SMEAR GRAM STAIN: CPT

## 2025-07-17 PROCEDURE — 6360000002 HC RX W HCPCS: Performed by: RADIOLOGY

## 2025-07-17 PROCEDURE — 87075 CULTR BACTERIA EXCEPT BLOOD: CPT

## 2025-07-17 PROCEDURE — 2709999900 CT DRAINAGE HEMATOMA/SEROMA/FLUID COLLECTION

## 2025-07-17 PROCEDURE — 87070 CULTURE OTHR SPECIMN AEROBIC: CPT

## 2025-07-17 RX ORDER — LIDOCAINE HYDROCHLORIDE 10 MG/ML
INJECTION, SOLUTION EPIDURAL; INFILTRATION; INTRACAUDAL; PERINEURAL PRN
Status: COMPLETED | OUTPATIENT
Start: 2025-07-17 | End: 2025-07-17

## 2025-07-17 RX ADMIN — LIDOCAINE HYDROCHLORIDE 10 ML: 10 INJECTION, SOLUTION EPIDURAL; INFILTRATION; INTRACAUDAL; PERINEURAL at 09:45

## 2025-07-17 NOTE — PROGRESS NOTES
[facility] NURSING PROGRESS NOTE    Procedure:  R Inguinal Aspiration  July 17, 2025  Amador Stone      Allergies:  No Known Allergies    There were no vitals filed for this visit.    Recent lab work reviewed with MD: yes   Procedure explained to patient by MD: yes   Informed consent obtained:yes  Family with patient: Yes     Mental Status:  Normal  Readiness to learn:  Yes  Barriers to learning: No    Pain Assessment Pre-Procedure:  Pain Present:  no  Pain Score:  0  Pain Quality/Description:  none    Time out Procedure Verification with:  [x] RN  [x] Physician  [x] Patient  [x] Other: CT Technologist  Procedure site marked, if applicable:  Yes    Note: Patient arrived A & O x 4, denies pain, breathing easily on room air, Spoke to Dr. Abby Whyte prior to procedure.  Procedural sedation:  LOCAL Only  Post Procedureal Note: Got out 82 ml of  Patient tolerated procedure well.  Breathing easily on room air.  Patient discharged home.    Pain Assessment Post-Procedure:  Pain Present:  no  Pain Score:  0  Pain Quality/Description:  None    Plan of Care Goals:  Safety measures met:  Yes  Patient understands explanation of procedure:  Yes      Yolanda Griffin RN.  R.N. 7/17/2025

## 2025-07-17 NOTE — BRIEF OP NOTE
IR Brief Postprocedure/Postoperative Note    Amador Stone  YOB: 1965  [id number]    Pre-operative Diagnosis: Post operative fluid collection right inguinal canal    Post-operative Diagnosis: Same    Procedure: CT guided percutaneous aspiration and drainage    Anesthesia: Local    Surgeons/Assistants: Abby Whyte MD    Estimated Blood Loss: Minimal    Complications: none    Specimens: 82 mL red-brown translucent fluid aspirated.    Abby Whyte MD   7/17/2025

## 2025-07-20 LAB
BACTERIA SPEC AEROBE CULT: NORMAL
BACTERIA SPEC ANAEROBE CULT: NORMAL
GRAM STN SPEC: NORMAL

## 2025-07-22 LAB
BACTERIA SPEC AEROBE CULT: NORMAL
BACTERIA SPEC ANAEROBE CULT: NORMAL
GRAM STN SPEC: NORMAL

## 2025-08-13 ENCOUNTER — HOSPITAL ENCOUNTER (OUTPATIENT)
Dept: ULTRASOUND IMAGING | Age: 60
Discharge: HOME | End: 2025-08-13
Payer: COMMERCIAL

## 2025-08-13 DIAGNOSIS — R22.9 SUBCUTANEOUS NODULES: ICD-10-CM

## 2025-08-13 PROCEDURE — 76604 US EXAM CHEST: CPT

## (undated) DEVICE — SUTURE VICRYL + SZ 3-0 L18IN ABSRB UD SH 1/2 CIR TAPERCUT NDL VCP864D

## (undated) DEVICE — Device

## (undated) DEVICE — SOLUTION IRRG STRL H2O 500 ML BTL 16/CA

## (undated) DEVICE — TIP COVER ACCESSORY

## (undated) DEVICE — CATHETER IV 16GA L1.16IN OD1.7018-1.7780MM

## (undated) DEVICE — MEGA SUTURECUT ND: Brand: ENDOWRIST

## (undated) DEVICE — COLUMN DRAPE

## (undated) DEVICE — AIRSEAL BIFURCATED SMOKE EVAC FILTERED TUBE SET: Brand: AIRSEAL

## (undated) DEVICE — TROCAR: Brand: KII FIOS FIRST ENTRY

## (undated) DEVICE — GLOVE,SURG,SENSICARE SLT,LF,PF,7.5: Brand: MEDLINE

## (undated) DEVICE — LIQUIBAND RAPID ADHESIVE 36/CS 0.8ML: Brand: MEDLINE

## (undated) DEVICE — ARM DRAPE

## (undated) DEVICE — MONOPOLAR CURVED SCISSORS: Brand: ENDOWRIST

## (undated) DEVICE — SEAL

## (undated) DEVICE — SUTURE MONOCRYL STRATAFIX SPRL + SZ 3 0 L8IN ABSRB UD L26MM SH SXMP1B427

## (undated) DEVICE — SUTURE VICRYL + SZ 2-0 L27IN ABSRB VLT SH 1/2 CIR TAPERPOINT VCP317H

## (undated) DEVICE — SOLUTION IRRIG 2000ML STRL H2O UROMATIC PLAS CONT USP

## (undated) DEVICE — FENESTRATED BIPOLAR FORCEPS: Brand: ENDOWRIST

## (undated) DEVICE — GLOVE ORANGE PI 7 1/2   MSG9075